# Patient Record
Sex: FEMALE | Race: WHITE
[De-identification: names, ages, dates, MRNs, and addresses within clinical notes are randomized per-mention and may not be internally consistent; named-entity substitution may affect disease eponyms.]

---

## 2018-02-19 ENCOUNTER — HOSPITAL ENCOUNTER (OUTPATIENT)
Dept: HOSPITAL 47 - RADMRIMAIN | Age: 18
Discharge: HOME | End: 2018-02-19
Payer: COMMERCIAL

## 2018-02-19 DIAGNOSIS — R42: ICD-10-CM

## 2018-02-19 DIAGNOSIS — R51: Primary | ICD-10-CM

## 2018-02-19 PROCEDURE — 70551 MRI BRAIN STEM W/O DYE: CPT

## 2018-02-19 PROCEDURE — 70544 MR ANGIOGRAPHY HEAD W/O DYE: CPT

## 2018-02-19 NOTE — MR
EXAMINATION TYPE: MR angio head wo con

 

DATE OF EXAM: 2/19/2018

 

COMPARISON: NONE

 

HISTORY: Headache / Dizziness and giddiness

 

TECHNIQUE: Time of flight images focusing on the [place] were performed without contrast..  
2-D and 3-D postprocessing imaging is performed on MRI scanner.

 

FINDINGS: There is codominant vertebral basilar system. Vertebral arteries are patent to basilar junc
tion. There are patent posterior communicating arteries identified bilaterally. There is no aneurysma
l change or significant focal stenosis seen.

 

Images of the anterior circulation show patent anterior communicating artery. There is no significant
 focal stenosis or aneurysmal change identified.

 

IMPRESSION: No aneurysmal change at the level of the [place].

## 2018-02-19 NOTE — MR
EXAMINATION TYPE: MR brain wo con

 

DATE OF EXAM: 2/19/2018

 

COMPARISON: NONE

 

HISTORY: Headache / Dizziness and giddiness

 

TECHNIQUE: Multiplanar, multisequence imaging of the brain and brainstem is performed without IV cont
rast.

 

FINDINGS:  

Diffusion weighted images demonstrate no evidence of a recent infarct or other diffusion abnormality.


 

There is no extraaxial fluid collection or significant white matter signal abnormality.  The ventricu
lar system and cisternal spaces are normal in size and appearance.  The brain volume is age appropria
te.

 

Midline structures demonstrate normal morphology.  The craniocervical junction appears within normal 
limits. Normal vascular flow voids are present. The visualized sinuses are clear and the globes are i
ntact.

 

IMPRESSION: Unremarkable study. No significant finding is seen to account for patient's symptoms.